# Patient Record
Sex: FEMALE | NOT HISPANIC OR LATINO | URBAN - METROPOLITAN AREA
[De-identification: names, ages, dates, MRNs, and addresses within clinical notes are randomized per-mention and may not be internally consistent; named-entity substitution may affect disease eponyms.]

---

## 2021-11-29 ENCOUNTER — EMERGENCY (EMERGENCY)
Facility: HOSPITAL | Age: 56
LOS: 1 days | End: 2021-11-29
Attending: EMERGENCY MEDICINE
Payer: COMMERCIAL

## 2021-11-29 VITALS
TEMPERATURE: 99 F | HEIGHT: 66 IN | HEART RATE: 99 BPM | SYSTOLIC BLOOD PRESSURE: 165 MMHG | WEIGHT: 160.06 LBS | RESPIRATION RATE: 17 BRPM | DIASTOLIC BLOOD PRESSURE: 100 MMHG | OXYGEN SATURATION: 99 %

## 2021-11-29 DIAGNOSIS — Z98.890 OTHER SPECIFIED POSTPROCEDURAL STATES: Chronic | ICD-10-CM

## 2021-11-29 PROCEDURE — G1004: CPT

## 2021-11-29 PROCEDURE — 70450 CT HEAD/BRAIN W/O DYE: CPT | Mod: 26,MG

## 2021-11-29 PROCEDURE — 72125 CT NECK SPINE W/O DYE: CPT | Mod: 26,MG

## 2021-11-29 PROCEDURE — 70486 CT MAXILLOFACIAL W/O DYE: CPT | Mod: MG

## 2021-11-29 PROCEDURE — 72125 CT NECK SPINE W/O DYE: CPT | Mod: MG

## 2021-11-29 PROCEDURE — 99285 EMERGENCY DEPT VISIT HI MDM: CPT

## 2021-11-29 PROCEDURE — 70486 CT MAXILLOFACIAL W/O DYE: CPT | Mod: 26,MG

## 2021-11-29 PROCEDURE — 70450 CT HEAD/BRAIN W/O DYE: CPT | Mod: MG

## 2021-11-29 PROCEDURE — 99284 EMERGENCY DEPT VISIT MOD MDM: CPT | Mod: 25

## 2021-11-29 RX ORDER — ACETAMINOPHEN 500 MG
650 TABLET ORAL ONCE
Refills: 0 | Status: COMPLETED | OUTPATIENT
Start: 2021-11-29 | End: 2021-11-29

## 2021-11-29 RX ADMIN — Medication 650 MILLIGRAM(S): at 18:04

## 2021-11-29 NOTE — ED PROVIDER NOTE - CLINICAL SUMMARY MEDICAL DECISION MAKING FREE TEXT BOX
56 F PMHx HTN (did not take meds today), GERD c/o pain to head and face s/p fall early this morning around 1AM. - swelling/ecchymosis to forehead and between eyes - tylenol, ice, ct

## 2021-11-29 NOTE — ED PROVIDER NOTE - MUSCULOSKELETAL, MLM
Spine appears normal, range of motion is not limited, no muscle or joint tenderness. No midline tenderness throughout.

## 2021-11-29 NOTE — ED PROVIDER NOTE - ATTENDING CONTRIBUTION TO CARE
56 F PMHx HTN (did not take meds today), GERD c/o pain to head and face s/p fall early this morning around 1AM. Pt was drinking etoh, believes she went to bend to  something, fell onto face (possibly hit exercise machine). Friend at bedside heard the fall and ran upstairs to check on pt who was face down; helped pt get off floor and she was awake, alert, acting normally. Took Excedrin this AM. Unsure about LOC. Not on blood thinners. States neck feels stiff otherwise denies other pain or injuries. On exam pt lying in bed NAD, healing bilateral raccoon eyes noted TMs clear no septal hematoma no altagracia orbital deformity noted, EOMI, PERRL, heart RR, lungs CTA, abd soft NT/ND. No midline C/T/L TTP. Pt presenting with facial injury. Will obtain CT imaging of head, face, and cervical spine. Pt is a 57 yo female who presents to the ED with a cc of facial injury. PMHx of HTN, GERD. Pt admits to being intoxicated yesterday and suffering a mechanical fall this morning around 1 am. She reports that she believes that she went to pick something up from the ground, lost her balance, and fell striking her face against an exercise machine she had. Denies LOC. Pt friend reports that she heard a loud band and then ran upstairs to check on the pt. Pt friend helped her up off the floor. Friends also confirms that pt was awake and at neurological baseline. She Reports that when she awoke she had a mild HA. She took Excedrin with min relief. Pt is not on AC. Pt also reports that she noted facial contusions and a sore neck.   On exam pt lying in bed NAD, healing bilateral raccoon eyes noted TMs clear no septal hematoma no altagracia orbital deformity noted, EOMI, PERRL, TMs clear no septal hematoma, heart RR, lungs CTA, abd soft NT/ND. No midline C/T/L TTP. FROM of UE and LE bilaterally with no pain on ROM NVI x 4. Pt presenting with facial injury. Will obtain CT imaging of head, face, and cervical spine.

## 2021-11-29 NOTE — ED PROVIDER NOTE - PATIENT PORTAL LINK FT
You can access the FollowMyHealth Patient Portal offered by [facility] by registering at the following website: [email or web address]. By joining [facility]’s FollowMyHealth portal, you will also be able to view your health information using other applications (apps) compatible with our system.

## 2021-11-29 NOTE — ED PROVIDER NOTE - NSFOLLOWUPINSTRUCTIONS_ED_ALL_ED_FT
Tylenol as needed for pain.  Apply ice to the area.  Follow up with your medical doctor.  Return to the Emergency Department for worsening or concerning symptoms.    -----------------------------------------------------      Facial or Scalp Contusion      A facial or scalp contusion is a deep bruise (contusion) on the face or head. Bruises happen when an injury causes bleeding under the skin. The bruise may turn blue, purple, or yellow.    Minor injuries will cause a bruise that is not painful, but worse bruises can stay painful and swollen for a few weeks. Injuries to the face and head usually cause a lot of swelling, especially around the eyes.      What are the causes?    •An injury to the face or head from an object.      •A fall.      •A hit to the face or head area.        What are the signs or symptoms?    •Swelling in the area of the injury.      •The injured area being a different color from normal.      •Pain or soreness in the injured area.        How is this treated?    •Applying cold compresses to the hurt area. This is often the best treatment.      •Taking over-the-counter medicines to help take the pain away, if your doctor tells you to take them.        Follow these instructions at home:      Managing pain, stiffness, and swelling    •If told, put ice on the injured area. To do this:  •Put ice in a plastic bag.      •Place a towel between your skin and the bag.      •Leave the ice on for 20 minutes, 2–3 times a day.        •If possible, raise (elevate) your head above the level of your heart while you are lying down. This may help reduce swelling.      General instructions     •Take over-the-counter and prescription medicines only as told by your doctor.      •Rest as told by your doctor.      •Return to your normal activities as told by your doctor. Ask your doctor what activities are safe for you.      •Keep all follow-up visits as told by your doctor. This is important.        Contact a doctor if:    •You have trouble biting or chewing.      •Your pain or swelling gets worse.      •You have pain when you move your eyes.        Get help right away if:    •You have very bad pain or a headache, and medicine does not help.      •You are very tired or confused.      •Your personality changes.      •You vomit.      •You have a nosebleed that does not stop.      •You see two of everything (double vision) or have blurry vision.      •You have clear fluid coming from your nose or ear, and it does not go away.      •You have problems walking or using your arms or legs.      •You feel very dizzy.        Summary    •A facial or scalp contusion is a deep bruise (contusion) on the face or head.      •Bruises happen when an injury causes bleeding under the skin.      •Minor injuries will cause a bruise that is not painful, but worse bruises can stay painful and swollen for a few weeks.      •Applying cold compresses to the hurt area is often the best treatment.      This information is not intended to replace advice given to you by your health care provider. Make sure you discuss any questions you have with your health care provider.

## 2021-11-29 NOTE — ED PROVIDER NOTE - OBJECTIVE STATEMENT
Pt is a 55 yo female who presents to the ED s/p fall with head pain. 56 F PMHx HTN (did not take meds today), GERD c/o pain to head and face s/p fall early this morning around 1AM. Pt was drinking etoh, believes she went to bend to  something, fell onto face (possibly hit exercise machine). Friend at bedside heard the fall and ran upstairs to check on pt who was face down; helped pt get off floor and she was awake, alert, acting normally. Took excedrin this AM. Unsure about LOC. Not on blood thinners. States neck feels stiff otherwise denies other pain or injuries.